# Patient Record
Sex: MALE | Race: NATIVE HAWAIIAN OR OTHER PACIFIC ISLANDER | NOT HISPANIC OR LATINO | Employment: UNEMPLOYED | URBAN - METROPOLITAN AREA
[De-identification: names, ages, dates, MRNs, and addresses within clinical notes are randomized per-mention and may not be internally consistent; named-entity substitution may affect disease eponyms.]

---

## 2021-11-09 ENCOUNTER — IMMUNIZATION (OUTPATIENT)
Dept: OBSTETRICS AND GYNECOLOGY | Facility: CLINIC | Age: 36
End: 2021-11-09
Payer: OTHER GOVERNMENT

## 2021-11-09 DIAGNOSIS — Z23 NEED FOR VACCINATION: Primary | ICD-10-CM

## 2021-11-09 PROCEDURE — 0004A COVID-19, MRNA, LNP-S, PF, 30 MCG/0.3 ML DOSE VACCINE: CPT | Mod: PBBFAC

## 2022-01-03 ENCOUNTER — PATIENT MESSAGE (OUTPATIENT)
Dept: ADMINISTRATIVE | Facility: OTHER | Age: 37
End: 2022-01-03
Payer: OTHER GOVERNMENT

## 2022-01-03 ENCOUNTER — LAB VISIT (OUTPATIENT)
Dept: PRIMARY CARE CLINIC | Facility: OTHER | Age: 37
End: 2022-01-03
Attending: INTERNAL MEDICINE
Payer: OTHER GOVERNMENT

## 2022-01-03 DIAGNOSIS — R05.9 COUGH: ICD-10-CM

## 2022-01-03 PROCEDURE — U0003 INFECTIOUS AGENT DETECTION BY NUCLEIC ACID (DNA OR RNA); SEVERE ACUTE RESPIRATORY SYNDROME CORONAVIRUS 2 (SARS-COV-2) (CORONAVIRUS DISEASE [COVID-19]), AMPLIFIED PROBE TECHNIQUE, MAKING USE OF HIGH THROUGHPUT TECHNOLOGIES AS DESCRIBED BY CMS-2020-01-R: HCPCS | Performed by: INTERNAL MEDICINE

## 2022-01-07 DIAGNOSIS — U07.1 COVID-19 VIRUS DETECTED: ICD-10-CM

## 2022-01-07 LAB
SARS-COV-2 RNA RESP QL NAA+PROBE: DETECTED
SARS-COV-2- CYCLE NUMBER: 21

## 2023-07-04 ENCOUNTER — HOSPITAL ENCOUNTER (EMERGENCY)
Facility: HOSPITAL | Age: 38
Discharge: HOME | End: 2023-07-04
Attending: EMERGENCY MEDICINE
Payer: OTHER GOVERNMENT

## 2023-07-04 VITALS
BODY MASS INDEX: 26.41 KG/M2 | SYSTOLIC BLOOD PRESSURE: 135 MMHG | HEART RATE: 74 BPM | DIASTOLIC BLOOD PRESSURE: 70 MMHG | HEIGHT: 72 IN | OXYGEN SATURATION: 98 % | RESPIRATION RATE: 18 BRPM | TEMPERATURE: 98 F | WEIGHT: 195 LBS
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
## 2023-07-04 DIAGNOSIS — R07.81 RIB PAIN: ICD-10-CM

## 2023-07-04 PROCEDURE — 93005 ELECTROCARDIOGRAM TRACING: CPT

## 2023-07-04 PROCEDURE — 93010 ELECTROCARDIOGRAM REPORT: CPT | Mod: ,,, | Performed by: INTERNAL MEDICINE

## 2023-07-04 PROCEDURE — 93010 EKG 12-LEAD: ICD-10-PCS | Mod: ,,, | Performed by: INTERNAL MEDICINE

## 2023-07-04 PROCEDURE — 99284 EMERGENCY DEPT VISIT MOD MDM: CPT | Mod: 25

## 2023-07-04 PROCEDURE — 25000003 PHARM REV CODE 250

## 2023-07-04 RX ORDER — HYDROCODONE BITARTRATE AND ACETAMINOPHEN 5; 325 MG/1; MG/1
1 TABLET ORAL
Status: COMPLETED | OUTPATIENT
Start: 2023-07-04 | End: 2023-07-04

## 2023-07-04 RX ORDER — IBUPROFEN 600 MG/1
600 TABLET ORAL EVERY 6 HOURS PRN
Qty: 30 TABLET | Refills: 0 | Status: SHIPPED | OUTPATIENT
Start: 2023-07-04

## 2023-07-04 RX ORDER — HYDROCODONE BITARTRATE AND ACETAMINOPHEN 5; 325 MG/1; MG/1
1 TABLET ORAL EVERY 6 HOURS PRN
Qty: 11 TABLET | Refills: 0 | Status: SHIPPED | OUTPATIENT
Start: 2023-07-04

## 2023-07-04 RX ORDER — ORPHENADRINE CITRATE 100 MG/1
100 TABLET, EXTENDED RELEASE ORAL 2 TIMES DAILY
Qty: 20 TABLET | Refills: 0 | Status: SHIPPED | OUTPATIENT
Start: 2023-07-04 | End: 2023-07-14

## 2023-07-04 RX ADMIN — HYDROCODONE BITARTRATE AND ACETAMINOPHEN 1 TABLET: 5; 325 TABLET ORAL at 03:07

## 2023-07-04 NOTE — ED PROVIDER NOTES
Encounter Date: 7/4/2023       History     Chief Complaint   Patient presents with    Chest Pain     Pt reports right sided rib pain after being hit in the ribs while playing basketball yesterday. Pt reports painful to take deep breaths.      37-year-old male with no past medical history presents to ED for emergent evaluation of right lateral rib pain after accidentally being injured while playing basketball at 2:00 p.m. yesterday.  Patient states that he was being charged and the other player hit his right lateral ribs with his forearm/elbow and landed on his body.  He reports pain with deep inhalation and movement.  He denies any head trauma or loss of consciousness.  He attempted ibuprofen at 9:30 a.m. today with no relief.  He denies any fever, chills, chest pain, shortness of breath, abdominal pain, nausea, vomiting, diarrhea, dysuria, hematuria.  No other symptoms reported.    The history is provided by the patient. No  was used.   Review of patient's allergies indicates:  No Known Allergies  No past medical history on file.  No past surgical history on file.  No family history on file.     Review of Systems   Constitutional:  Negative for chills and fever.   HENT:  Negative for congestion, ear pain, rhinorrhea and sore throat.    Eyes:  Negative for redness.   Respiratory:  Negative for cough and shortness of breath.    Cardiovascular:  Negative for chest pain.   Gastrointestinal:  Negative for abdominal pain, diarrhea, nausea and vomiting.   Genitourinary:  Negative for decreased urine volume, difficulty urinating, dysuria, frequency, hematuria and urgency.   Musculoskeletal:  Positive for myalgias. Negative for back pain and neck pain.   Skin:  Negative for rash.   Neurological:  Negative for headaches.        (-) head trauma  (-) loc   Psychiatric/Behavioral:  Negative for confusion.      Physical Exam     Initial Vitals [07/04/23 1432]   BP Pulse Resp Temp SpO2   132/72 86 18 97.6 °F  (36.4 °C) 98 %      MAP       --         Physical Exam    Nursing note and vitals reviewed.  Constitutional: He appears well-developed and well-nourished. He is not diaphoretic.  Non-toxic appearance. No distress.   HENT:   Head: Normocephalic and atraumatic.   Right Ear: Hearing, tympanic membrane, external ear and ear canal normal. Tympanic membrane is not perforated, not erythematous and not bulging.   Left Ear: Hearing, tympanic membrane, external ear and ear canal normal. Tympanic membrane is not perforated, not erythematous and not bulging.   Nose: Nose normal.   Mouth/Throat: Uvula is midline and oropharynx is clear and moist.   Eyes: Conjunctivae and EOM are normal.   Neck: Neck supple.   Normal range of motion.   Full passive range of motion without pain.     Cardiovascular:            Pulses:       Radial pulses are 2+ on the right side and 2+ on the left side.   Pulmonary/Chest: Effort normal and breath sounds normal. He has no decreased breath sounds.    Tenderness to palpation to right upper lateral ribs.  No bony deformities.  No surrounding erythema, edema, bruising, rash, or cellulitis.   Abdominal: Abdomen is soft and flat. Bowel sounds are normal. There is no abdominal tenderness.   No right CVA tenderness.  No left CVA tenderness. There is no rebound and no guarding.   Musculoskeletal:      Cervical back: Full passive range of motion without pain, normal range of motion and neck supple. No rigidity.     Neurological: He is alert. No cranial nerve deficit.   Neuro intact.  Strength and sensation intact to bilateral upper and lower extremities.   Skin: Skin is warm and dry. No rash noted.       ED Course   Procedures  Labs Reviewed - No data to display  EKG Readings: (Independently Interpreted)   EKG reveals sinus rhythm with marked sinus arrhythmia.  DC interval 146.  Ventricular rate 74.  .  No STEMI.  Rightward axis.       Imaging Results              X-Ray Ribs 3 Views Bilateral (Final  result)  Result time 07/04/23 15:09:05      Final result by Dylon Villaseñor MD (07/04/23 15:09:05)                   Impression:      No definite displaced rib fracture identified.  CT could provide a more sensitive assessment if warranted clinically.    No evidence of pneumothorax.      Electronically signed by: Dylon Villaseñor MD  Date:    07/04/2023  Time:    15:09               Narrative:    EXAMINATION:  XR CHEST PA AND LATERAL; XR RIBS 3 VIEWS BILATERAL    CLINICAL HISTORY:  Pleurodynia    TECHNIQUE:  PA and lateral views of the chest were performed.    Two views both ribs.    COMPARISON:  None    FINDINGS:  The cardiomediastinal silhouette is normal in size and midline. Pulmonary vascularity appears within normal limits.    The lungs appear clear without confluent pulmonary parenchymal opacity. No pleural fluid.    No definite displaced rib fracture identified.    Several punctate metallic densities over the right lower lateral chest, presumably indicating the site of pain.                                       X-Ray Chest PA And Lateral (Final result)  Result time 07/04/23 15:09:05      Final result by Dylon Villaseñor MD (07/04/23 15:09:05)                   Impression:      No definite displaced rib fracture identified.  CT could provide a more sensitive assessment if warranted clinically.    No evidence of pneumothorax.      Electronically signed by: Dylon Villaseñor MD  Date:    07/04/2023  Time:    15:09               Narrative:    EXAMINATION:  XR CHEST PA AND LATERAL; XR RIBS 3 VIEWS BILATERAL    CLINICAL HISTORY:  Pleurodynia    TECHNIQUE:  PA and lateral views of the chest were performed.    Two views both ribs.    COMPARISON:  None    FINDINGS:  The cardiomediastinal silhouette is normal in size and midline. Pulmonary vascularity appears within normal limits.    The lungs appear clear without confluent pulmonary parenchymal opacity. No pleural fluid.    No definite displaced rib fracture  identified.    Several punctate metallic densities over the right lower lateral chest, presumably indicating the site of pain.                                       Medications   HYDROcodone-acetaminophen 5-325 mg per tablet 1 tablet (1 tablet Oral Given 7/4/23 1510)     Medical Decision Making:   Clinical Tests:   Radiological Study: Ordered and Reviewed  Medical Tests: Ordered and Reviewed  ED Management:  This is a 37-year-old male with no past medical history presents to ED for emergent evaluation of right lateral rib pain after accidentally being injured while playing basketball at 2:00 p.m. yesterday. On physical exam, patient is well-appearing and in no acute distress.  Nontoxic appearing.  Lungs are clear to auscultation bilaterally.  Abdomen is soft and nontender.  No guarding, rigidity, rebound.  2+ radial pulses bilaterally.  Posterior oropharynx is not erythematous.  No edema or exudate.  Uvula midline.  Bilateral tympanic membrane is normal.  No erythema, bulging, or perforations.  Neuro intact.  Strength and sensation intact bilateral upper and lower extremities.  Tenderness to palpation to right upper lateral ribs.  No bony deformities.  No surrounding erythema, edema, bruising, rash, or cellulitis.  Norco ordered for pain.  EKG reveals sinus rhythm with marked sinus arrhythmia.  MT interval 146.  Ventricular rate 74.  .  No STEMI.  Rightward axis.  X-ray revealed No definite displaced rib fracture identified.  CT could provide a more sensitive assessment if warranted clinically.     No evidence of pneumothorax.   Will discharge patient on short course of pain medicine.  Urged prompt follow-up with PCP for further evaluation.    Strict return precautions given. I discussed with the patient/family the diagnosis, treatment plan, indications for return to the emergency department, and for expected follow-up. The patient/family verbalized an understanding. The patient/family is asked if there are  any questions or concerns. We discuss the case, until all issues are addressed to the patient/family's satisfaction. Patient/family understands and is agreeable to the plan. Patient is stable and ready for discharge.                          Clinical Impression:   Final diagnoses:  [R07.81] Rib pain  [W19.XXXA] Fall, initial encounter (Primary)        ED Disposition Condition    Discharge Stable          ED Prescriptions       Medication Sig Dispense Start Date End Date Auth. Provider    ibuprofen (ADVIL,MOTRIN) 600 MG tablet Take 1 tablet (600 mg total) by mouth every 6 (six) hours as needed for Pain or Temperature greater than (100.5 or greater). 30 tablet 7/4/2023 -- Schuyler Chaidez PA-C    orphenadrine (NORFLEX) 100 mg tablet Take 1 tablet (100 mg total) by mouth 2 (two) times daily. for 10 days 20 tablet 7/4/2023 7/14/2023 Schuyler Chaidez PA-C    HYDROcodone-acetaminophen (NORCO) 5-325 mg per tablet Take 1 tablet by mouth every 6 (six) hours as needed for Pain. 11 tablet 7/4/2023 -- Schuyler Chaidez PA-C          Follow-up Information       Follow up With Specialties Details Why Contact Info    [facility]  Schedule an appointment as soon as possible for a visit in 2 days for further evaluation [address] [place] [address]  [phone]      [facility] Emergency Dept Emergency Medicine In 2 days If symptoms worsen [address]  [facility] [id number] [phone]             Schuyler Chaidez PA-C  07/04/23 [id number]

## 2023-07-04 NOTE — Clinical Note
"Otis Cm" Frederick was seen and treated in our emergency department on 7/4/2023.  He may return to work on 07/05/2023.       If you have any questions or concerns, please don't hesitate to call.      Schuyler Chaidez PA-C"

## 2023-07-04 NOTE — DISCHARGE INSTRUCTIONS

## 2023-07-04 NOTE — ED TRIAGE NOTES
Pt presents to Ed from home with c/I right rib pain since yesterday. Pt states he was playing basketball when he took a charge and the other individual fell on top of the patient, landing elbow first onto pt's right ribcage. Pt reports severe pain on inhalation. Pt denies cp, sob, n/v/d

## 2025-04-22 ENCOUNTER — LAB VISIT (OUTPATIENT)
Dept: LAB | Facility: HOSPITAL | Age: 40
End: 2025-04-22
Attending: EMERGENCY MEDICINE
Payer: OTHER GOVERNMENT

## 2025-04-22 DIAGNOSIS — Z02.1 PRE-EMPLOYMENT EXAMINATION: Primary | ICD-10-CM

## 2025-04-22 LAB
HBV SURFACE AB SER-ACNC: 294.52 MIU/ML
HBV SURFACE AB SERPL IA-ACNC: REACTIVE M[IU]/ML

## 2025-04-22 PROCEDURE — 86706 HEP B SURFACE ANTIBODY: CPT

## 2025-04-22 PROCEDURE — 86762 RUBELLA ANTIBODY: CPT

## 2025-04-22 PROCEDURE — 36415 COLL VENOUS BLD VENIPUNCTURE: CPT

## 2025-04-22 PROCEDURE — 86765 RUBEOLA ANTIBODY: CPT

## 2025-04-22 PROCEDURE — 86480 TB TEST CELL IMMUN MEASURE: CPT

## 2025-04-22 PROCEDURE — 86735 MUMPS ANTIBODY: CPT

## 2025-04-22 PROCEDURE — 86787 VARICELLA-ZOSTER ANTIBODY: CPT

## 2025-04-23 LAB
MITOGEN MINUS NIL (OHS): 6.23
MUMPS IGG (OHS): 52.3 AU/ML
MUMPS IGG INTERPRETATION (OHS): POSITIVE
NIL TB SYNCED (OHS): 0.01
QUANTIFERON GOLD INTERP (OHS): NEGATIVE
RUBEOLA (MEASLES) IGG (OHS): 195 AU/ML
RUBEOLA IGG INTERP. (OHS): POSITIVE
RUBV IGG SER-ACNC: 32.6 IU/ML
RUBV IGG SER-IMP: REACTIVE
TB1 AG MINUS NIL (OHS): 0.02
TB2 AG MINUS NIL (OHS): 0.03
V.ZOSTER IGG INTERP (OHS): POSITIVE
VARICELLA ZOSTER IGG (OHS): 17.1 S/CO

## 2025-08-18 DIAGNOSIS — S43.005A UNSPECIFIED DISLOCATION OF LEFT SHOULDER JOINT, INITIAL ENCOUNTER: Primary | ICD-10-CM

## 2025-08-20 DIAGNOSIS — S43.085A OTHER DISLOCATION OF LEFT SHOULDER JOINT, INITIAL ENCOUNTER: Primary | ICD-10-CM

## 2025-08-20 DIAGNOSIS — M25.551 PAIN IN RIGHT HIP: Primary | ICD-10-CM

## 2025-08-21 ENCOUNTER — HOSPITAL ENCOUNTER (OUTPATIENT)
Dept: RADIOLOGY | Facility: OTHER | Age: 40
Discharge: HOME | End: 2025-08-21
Attending: ORTHOPAEDIC SURGERY
Payer: OTHER GOVERNMENT

## 2025-08-21 DIAGNOSIS — S43.085A OTHER DISLOCATION OF LEFT SHOULDER JOINT, INITIAL ENCOUNTER: ICD-10-CM

## 2025-08-21 DIAGNOSIS — M25.551 PAIN IN RIGHT HIP: ICD-10-CM

## 2025-08-21 PROCEDURE — 73221 MRI JOINT UPR EXTREM W/O DYE: CPT | Mod: 26,LT,, | Performed by: RADIOLOGY

## 2025-08-21 PROCEDURE — 73721 MRI JNT OF LWR EXTRE W/O DYE: CPT | Mod: TC,RT

## 2025-08-21 PROCEDURE — 73721 MRI JNT OF LWR EXTRE W/O DYE: CPT | Mod: 26,RT,, | Performed by: RADIOLOGY

## 2025-08-21 PROCEDURE — 73221 MRI JOINT UPR EXTREM W/O DYE: CPT | Mod: TC,LT
